# Patient Record
Sex: MALE | Race: OTHER | NOT HISPANIC OR LATINO
[De-identification: names, ages, dates, MRNs, and addresses within clinical notes are randomized per-mention and may not be internally consistent; named-entity substitution may affect disease eponyms.]

---

## 2019-12-08 ENCOUNTER — FORM ENCOUNTER (OUTPATIENT)
Age: 39
End: 2019-12-08

## 2019-12-08 PROBLEM — Z00.00 ENCOUNTER FOR PREVENTIVE HEALTH EXAMINATION: Status: ACTIVE | Noted: 2019-12-08

## 2019-12-09 ENCOUNTER — OUTPATIENT (OUTPATIENT)
Dept: OUTPATIENT SERVICES | Facility: HOSPITAL | Age: 39
LOS: 1 days | End: 2019-12-09
Payer: COMMERCIAL

## 2019-12-09 ENCOUNTER — APPOINTMENT (OUTPATIENT)
Dept: ORTHOPEDIC SURGERY | Facility: CLINIC | Age: 39
End: 2019-12-09
Payer: MEDICAID

## 2019-12-09 VITALS
DIASTOLIC BLOOD PRESSURE: 66 MMHG | OXYGEN SATURATION: 98 % | SYSTOLIC BLOOD PRESSURE: 106 MMHG | HEART RATE: 63 BPM | WEIGHT: 160 LBS | BODY MASS INDEX: 23.7 KG/M2 | HEIGHT: 69 IN

## 2019-12-09 DIAGNOSIS — Z78.9 OTHER SPECIFIED HEALTH STATUS: ICD-10-CM

## 2019-12-09 DIAGNOSIS — Z80.0 FAMILY HISTORY OF MALIGNANT NEOPLASM OF DIGESTIVE ORGANS: ICD-10-CM

## 2019-12-09 PROCEDURE — 99204 OFFICE O/P NEW MOD 45 MIN: CPT

## 2019-12-09 PROCEDURE — 73564 X-RAY EXAM KNEE 4 OR MORE: CPT

## 2019-12-09 PROCEDURE — 73564 X-RAY EXAM KNEE 4 OR MORE: CPT | Mod: 26,LT,RT

## 2019-12-09 NOTE — PHYSICAL EXAM
[de-identified] : General: Well-nourished, well-developed, alert, and in no acute distress.\par Head: Normocephalic.\par Eyes: Pupils equal round reactive to light and accommodation, extraocular muscles intact, normal sclera.\par Nose: No nasal discharge.\par Cardiac: Regular rate. Extremities are warm and well perfused. Distal pulses are symmetric bilaterally.\par Respiratory: No labored breathing.\par Extremities: Sensation is intact distally bilaterally.  Distal pulses are symmetric bilaterally\par Neurologic: No focal deficits\par Skin: Normal skin color, texture, and turgor\par Psychiatric: Normal affect\par \par RGHT KNEE:\par \par Inspection: no bruising, swelling, erythema\par Joint Effusion:no \par ROM: Knee Flexion 130-140 , Knee Extension 0\par Palpation:No pain at joint line, patellar tendon, MFC/LFC, Medial/Lateral Tibial Plateau\par Leg Length Discrepancy:no\par Patella: no apprehension\par Distal Pulses: normal\par Lower Extremity Strength:normal, 5/5 \par Lower Extremity Reflexes:normal, 2+\par Lower Extremity Sensation: normal\par \par Special Tests:\par [facility]:Negative \par Celena: Negative\par Anterior Drawer:Negative\par Anterior Lachman:Negative\par Posterior Drawer:Negative \par Varus/Valgus:Negative, no instability\par \par LEFT KNEE:\par \par Inspection: no bruising, swelling, erythema, MILDLY PROMINENT SUPERLATERAL PATELLA\par Joint Effusion:no \par ROM: Knee Flexion 130-140 , Knee Extension 0\par Palpation: TENDERNESS AT DISTAL HAMSTRING TENDON, No pain at joint line, patellar tendon, MFC/LFC, Medial/Lateral Tibial Plateau\par Leg Length Discrepancy:no\par Patella: no apprehension\par Distal Pulses: normal\par Lower Extremity Strength:normal, 5/5 \par Lower Extremity Reflexes:normal, 2+\par Lower Extremity Sensation: normal\par \par Special Tests:\par [facility]:Negative \par Celena: Negative\par Anterior Drawer:Negative\par Anterior Lachman:Negative\par Posterior Drawer:Negative \par Varus/Valgus:Negative, no instability\par \par  [de-identified] : Xray Bilateral Knees - Multiple views were reviewed with the patient in detail.  There is no acute fracture or dislocation.  There is no joint effusion.  Joint spaces are preserved.  There is right patella alia.  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 We will await formal radiology reading.\par \par

## 2019-12-09 NOTE — HISTORY OF PRESENT ILLNESS
[3] : a current pain level of 3/10 [de-identified] : The patient is a 39 year old man who presents with left knee pain.\par \par The patient presents with a several week history of left posterior knee pain.  The patient denies precipitating injury or trauma.  He had a remote injury in the spring of 2019 after a mechanical fall on his anterior knee.  He went to the ER, and was initially casted mistakenly for what was thought to be a patella fracture, but ultimately was found to be a chronic healed patella fracture vs. bipartite patella.  The patient denies significant swelling or bruising.  The patient denies recent mechanical symptoms including catching, locking, buckling.\par \par He enjoys playing basketball.  \par \par Pain is 1-3/10, described as achy, improved with medication, worse with prolonged standing.

## 2019-12-09 NOTE — DISCUSSION/SUMMARY
[Medication Risks Reviewed] : Medication risks reviewed [de-identified] : The patient is a 39 year old man with history of left superolateral bitartite patella who presents with several week history of left posterior knee pain likely secondary to distal hamstring tendinopathy.\par \par Imaging was reviewed with the patient in detail.  All questions were answered appropriately.\par \par Patient was prescribed a course of physical therapy today.  The patient was also provided some general home exercises.  The patient was counseled on activity modification.\par \par Patient was prescribed a short course of Diclofenac.  Appropriate use of medication was reviewed with the patient in detail. Risks, benefits, and adverse effects medication were discussed.\par \par Follow-up in 4-6 weeks.\par \par Patient appreciates and agrees with current plan.\par \par This note was generated using dragon medical dictation software.  A reasonable effort has been made for proofreading its contents, but typos may still remain.  If there are any questions or points of clarification needed please notify my office.\par \par

## 2020-01-06 ENCOUNTER — RX RENEWAL (OUTPATIENT)
Age: 40
End: 2020-01-06

## 2020-01-06 RX ORDER — DICLOFENAC SODIUM 75 MG/1
75 TABLET, DELAYED RELEASE ORAL TWICE DAILY
Qty: 60 | Refills: 1 | Status: ACTIVE | COMMUNITY
Start: 2019-12-09 | End: 1900-01-01

## 2022-12-21 ENCOUNTER — APPOINTMENT (OUTPATIENT)
Dept: ORTHOPEDIC SURGERY | Facility: CLINIC | Age: 42
End: 2022-12-21

## 2022-12-21 VITALS
HEIGHT: 69 IN | OXYGEN SATURATION: 98 % | DIASTOLIC BLOOD PRESSURE: 82 MMHG | SYSTOLIC BLOOD PRESSURE: 128 MMHG | HEART RATE: 64 BPM

## 2022-12-21 DIAGNOSIS — Q74.1 CONGENITAL MALFORMATION OF KNEE: ICD-10-CM

## 2022-12-21 DIAGNOSIS — S76.312A STRAIN OF MUSCLE, FASCIA AND TENDON OF THE POSTERIOR MUSCLE GROUP AT THIGH LEVEL, LEFT THIGH, INITIAL ENCOUNTER: ICD-10-CM

## 2022-12-21 PROCEDURE — 73564 X-RAY EXAM KNEE 4 OR MORE: CPT | Mod: LT,RT

## 2022-12-21 PROCEDURE — 99203 OFFICE O/P NEW LOW 30 MIN: CPT

## 2022-12-21 NOTE — HISTORY OF PRESENT ILLNESS
[de-identified] : 42 year old male presents with left knee pain. PT states a month ago he twisted is left knee when his foot was caught between to metal plates, he did not hear a pop. Most of his pain is in the back of knee now. He went to the ER on 12/13 which referred him to f/u with ORtho if pain continues.

## 2022-12-21 NOTE — PHYSICAL EXAM
[de-identified] : Patient is well nourished, well-developed, in no acute distress, with appropriate mood and affect. The patient is oriented to time, place, and person. Gait evaluation does not reveal a limp. The skin examination does not reveal obvious lesions, lacerations, or ecchymosis.\par \par Left knee= no effusion, ROM 0-140, No joint tenderness, Neg Destin/Celena, Neg LAchman, Quad 5/5\par TTP posterior thigh, mild pain with knee flexion with resistance, no palpable defect [de-identified] : 4 views of both knees show normal bony alignment, no subluxation or joint line narrowing. Left knee bipartite superolateral patella.

## 2022-12-21 NOTE — DISCUSSION/SUMMARY
[de-identified] : 42 year old male presents with left knee pain. PT states a month ago he twisted is left knee when his foot was caught between to metal plates, he did not hear a pop. Most of his pain is in the back of knee now. On exam there is no signs of meniscal or ligament injury. He has TTP to posterior thigh and and pain with knee flexion with resistance which indicates he likely sustained a hamstring strain. \par \par Plan\par PT for hamstring tear/strain\par Meloxicam\par Avoid jogging, can cycle/core work/light leg press\par F/u 4 weeks.

## 2023-01-26 ENCOUNTER — APPOINTMENT (OUTPATIENT)
Dept: ORTHOPEDIC SURGERY | Facility: CLINIC | Age: 43
End: 2023-01-26
Payer: MEDICAID

## 2023-01-26 DIAGNOSIS — S80.00XA CONTUSION OF UNSPECIFIED KNEE, INITIAL ENCOUNTER: ICD-10-CM

## 2023-01-26 DIAGNOSIS — S80.10XA CONTUSION OF UNSPECIFIED KNEE, INITIAL ENCOUNTER: ICD-10-CM

## 2023-01-26 PROCEDURE — 99213 OFFICE O/P EST LOW 20 MIN: CPT

## 2023-01-26 NOTE — DISCUSSION/SUMMARY
[de-identified] : 43 yo male here for f/u of left hamstring strain after a twisting injury in November when his foot was caught between two metal plates. He was only able to go to 4-5 sessions of PT and did not do any home exercises. Despite his lack of PT, hamstring is improving and now quads remain weak but will continue to improve with strengthening. No signs of meniscal or ligament tear.\par \par Plan\par Continue PT 2x/wk and do home exercises\par Can proceed with gym exercise with restrictions as discussed No running \par Meloxicam prn \par f/u 6 wks.\par May continue to work.

## 2023-01-26 NOTE — HISTORY OF PRESENT ILLNESS
[de-identified] : 42 year old male followup for left hamstring pain s/p having leg and knee pinned in heavy machinery. . Pt states that his hamstring pain comes and goes. Pt states he was doing good up until yesterday when the pain came back. Pt states did a 4-5 sessions of  physical therapy but stopped because he had to travel out of state. He still has posterior knee pain and now feels like the knee is weak and might go out, but no buckling.

## 2023-01-26 NOTE — PHYSICAL EXAM
[de-identified] : Left knee= ROM 0-140, no effusion, No medial/lateral joint tenderness, Neg Destin/Celena, Quad 4/5\par No TTP posterior thigh, No pain with knee flexion with resistance, no palpable defect\par

## 2023-02-23 ENCOUNTER — RX RENEWAL (OUTPATIENT)
Age: 43
End: 2023-02-23

## 2023-02-23 RX ORDER — MELOXICAM 15 MG/1
15 TABLET ORAL DAILY
Qty: 30 | Refills: 1 | Status: ACTIVE | COMMUNITY
Start: 2022-12-21 | End: 1900-01-01

## 2023-03-20 ENCOUNTER — APPOINTMENT (OUTPATIENT)
Dept: ORTHOPEDIC SURGERY | Facility: CLINIC | Age: 43
End: 2023-03-20
Payer: MEDICAID

## 2023-03-20 DIAGNOSIS — S76.312D STRAIN OF MUSCLE, FASCIA AND TENDON OF THE POSTERIOR MUSCLE GROUP AT THIGH LEVEL, LEFT THIGH, SUBSEQUENT ENCOUNTER: ICD-10-CM

## 2023-03-20 DIAGNOSIS — M76.892 OTHER SPECIFIED ENTHESOPATHIES OF LEFT LOWER LIMB, EXCLUDING FOOT: ICD-10-CM

## 2023-03-20 PROCEDURE — 99213 OFFICE O/P EST LOW 20 MIN: CPT

## 2023-03-20 RX ORDER — MELOXICAM 15 MG/1
15 TABLET ORAL DAILY
Qty: 30 | Refills: 2 | Status: ACTIVE | COMMUNITY
Start: 2023-03-20 | End: 1900-01-01

## 2023-03-20 NOTE — PHYSICAL EXAM
[de-identified] : Left knee= ROM 0-140, no effusion, No med/lateral joint tenderness, Neg Destin\par No TTP posterior thigh, SOME CRAMPING AND DISCOMFORT on resisted knee flexion with resistance, no palpable defect

## 2023-03-20 NOTE — HISTORY OF PRESENT ILLNESS
[de-identified] : 42 yo M f/u for left hamstring pain s/p having leg pinned in between heavy machinery 12/13/22. He has seen improvement after attending PT. Would like to continue PT. No new complaints. No/Unable to asses

## 2023-03-20 NOTE — DISCUSSION/SUMMARY
[de-identified] : 44 yo M f/u for left hamstring pain s/p having leg pinned in between heavy machinery 12/13/22. He has seen improvement after attending PT. His hamstring is improving on exam and has no discomfort with activities of daily living however he needs to continue to strengthen hamstrings as he ahs some residual weakness. No sign of meniscal or ligament tear. \par \par Plan\par Continue PT\par Meloxicam for 2 weeks\par f/u 4 wks.

## 2023-04-24 ENCOUNTER — APPOINTMENT (OUTPATIENT)
Dept: ORTHOPEDIC SURGERY | Facility: CLINIC | Age: 43
End: 2023-04-24

## 2024-12-31 ENCOUNTER — APPOINTMENT (OUTPATIENT)
Dept: ORTHOPEDIC SURGERY | Facility: CLINIC | Age: 44
End: 2024-12-31
Payer: MEDICAID

## 2024-12-31 VITALS — HEIGHT: 70 IN | BODY MASS INDEX: 22.9 KG/M2 | WEIGHT: 160 LBS

## 2024-12-31 DIAGNOSIS — M72.2 PLANTAR FASCIAL FIBROMATOSIS: ICD-10-CM

## 2024-12-31 PROCEDURE — 73630 X-RAY EXAM OF FOOT: CPT | Mod: LT

## 2024-12-31 PROCEDURE — 99203 OFFICE O/P NEW LOW 30 MIN: CPT | Mod: 25

## 2024-12-31 RX ORDER — NABUMETONE 500 MG/1
500 TABLET, FILM COATED ORAL
Qty: 60 | Refills: 0 | Status: ACTIVE | COMMUNITY
Start: 2024-12-31 | End: 1900-01-01